# Patient Record
Sex: MALE | Race: WHITE
[De-identification: names, ages, dates, MRNs, and addresses within clinical notes are randomized per-mention and may not be internally consistent; named-entity substitution may affect disease eponyms.]

---

## 2019-06-09 ENCOUNTER — HOSPITAL ENCOUNTER (EMERGENCY)
Dept: HOSPITAL 91 - E/R | Age: 39
Discharge: HOME | End: 2019-06-09
Payer: MEDICAID

## 2019-06-09 ENCOUNTER — HOSPITAL ENCOUNTER (EMERGENCY)
Dept: HOSPITAL 10 - E/R | Age: 39
Discharge: HOME | End: 2019-06-09
Payer: MEDICAID

## 2019-06-09 VITALS
BODY MASS INDEX: 43.44 KG/M2 | HEIGHT: 66 IN | BODY MASS INDEX: 43.44 KG/M2 | WEIGHT: 270.29 LBS | WEIGHT: 270.29 LBS | HEIGHT: 66 IN

## 2019-06-09 VITALS — HEART RATE: 70 BPM | RESPIRATION RATE: 16 BRPM | SYSTOLIC BLOOD PRESSURE: 138 MMHG | DIASTOLIC BLOOD PRESSURE: 86 MMHG

## 2019-06-09 DIAGNOSIS — L97.512: Primary | ICD-10-CM

## 2019-06-09 DIAGNOSIS — F17.210: ICD-10-CM

## 2019-06-09 PROCEDURE — 82962 GLUCOSE BLOOD TEST: CPT

## 2019-06-09 PROCEDURE — 73630 X-RAY EXAM OF FOOT: CPT

## 2019-06-09 PROCEDURE — 99283 EMERGENCY DEPT VISIT LOW MDM: CPT

## 2019-06-09 NOTE — ERD
ER Documentation


Chief Complaint


Chief Complaint





RIGHT DORSAL PLANTAR ULCER, YELLOW DISCHARGE NOTED,+PAIN





HPI


This is a 38-year-old male who is here for a right foot ulcer.  Patient had an 


ulcer there for about 3 weeks and he says he is not diabetic.  Patient says he 


has had them before and had a check for diabetes but he is not.  He states that 


there is no pain and that he is visiting from [place] and wants to have it 


looked out





ROS


All systems reviewed and are negative except as per history of present illness.





PMhx/Soc


Medical and Surgical Hx:  pt denies Medical Hx, pt denies Surgical Hx


Hx Alcohol Use:  No


Hx Substance Use:  No


Hx Tobacco Use:  Yes


Smoking Status:  Current some day smoker





FmHx


Family History:  No coronary disease





Physical Exam


Vitals





Vital Signs


  Date      Temp  Pulse  Resp  B/P (MAP)   Pulse Ox  O2          O2 Flow    FiO2


Time                                                 Delivery    Rate


    19  98.0    108    16      154/96        95


     12:13                          (115)





Physical Exam


Const:   No acute distress


Head:   Atraumatic 


Eyes:    Normal Conjunctiva


ENT:    Normal External Ears, Nose and Mouth.


Neck:               Full range of motion. No meningismus.


Resp:   Clear to auscultation bilaterally


Cardio:   Regular rate and rhythm, no murmurs


Abd:    Soft, non tender, non distended. Normal bowel sounds


Skin:   No petechiae or rashes


Back:   No midline or flank tenderness


Ext:    At the ball of the foot there is a dime sized ulcer that is fairly deep 


located on/at the junction of the second metatarsal phalangeal joint, there is 


no erythema


Neur:   Awake and alert


Psych:    Normal Mood and Affect


Results 24 hrs





Laboratory Tests


                         Test
            19
12:15


                         Bedside Glucose    113 mg/dL








[facility]/[place] [id number]


                        Radiology Main Line: [phone]





                            DIAGNOSTIC IMAGING REPORT





Patient: MYA COONEY   : 1980   Age: 38  Sex: M                      


 


       MR #:    [id number]   Acct #:   [id number]    DOS: 19 1354


Ordering MD: NATASHA SOLER DO   Location:  E/R   Room/Bed:                


                           


                                        


PROCEDURE:   XR Foot. 


 


CLINICAL INDICATION:  Right foot pain


 


TECHNIQUE:    Three views of the right foot are available for review. 


 


COMPARISON:   None available


 


FINDINGS:


 


Is cortical thickening and irregularity of the fifth proximal phalanx.   Bone 


mineral density is preserved.  The articular surfaces are smooth without 


evidence of marginal erosions. Os peroneum. There is soft tissue ulceration at 


the plantar aspect of the forefoot.  


 


IMPRESSION:


 


1.  Plantar soft tissue ulceration at the forefoot, partially assessed. 


2.  Cortical irregularity and thickening at the fifth proximal phalanx either 


from prior trauma or infection. Please note that MRI is more sensitive in 


evaluating for early changes of osteomyelitis.


 


RPTAT: UU


_____________________________________________ 


.Will Taveras MD, MD           Date    Time 


Electronically viewed and signed by .Will Taveras MD, MD on 2019 


14:14 


 


D:  2019 14:14  T:  2019 14:14


.d/





CC: NATASHA SOLER DO





[phone]

















Fingerstick is 113 this patient has a foot ulcer








Of an uncertain etiology it is not from diabetes apparently.  No signs of 


osteomyelitis.  The cortical irregularity on the x-ray of the foot is located at


the fifth toe and not the area where the ulcer is.  There is no sign of 


infection whatsoever on his foot at this area.  I will cover him with antibiotic


therapy and I reviewed with him home wound care that he needs to see a wound 


care physician when he gets back to [place]





Departure


Diagnosis:  


   Primary Impression:  


   Foot ulcer


   Laterality:  right  Non-pressure ulcer stage:  with fat layer exposed  


   Qualified Codes:  L97.512 - Non-pressure chronic ulcer of other part of right


   foot with fat layer exposed


Condition:  Stable











NATASHA SOLER DO          2019 15:33

## 2019-08-18 ENCOUNTER — HOSPITAL ENCOUNTER (EMERGENCY)
Dept: HOSPITAL 10 - E/R | Age: 39
Discharge: HOME | End: 2019-08-18
Payer: MEDICAID

## 2019-08-18 ENCOUNTER — HOSPITAL ENCOUNTER (EMERGENCY)
Dept: HOSPITAL 91 - E/R | Age: 39
Discharge: HOME | End: 2019-08-18
Payer: SELF-PAY

## 2019-08-18 VITALS — HEART RATE: 113 BPM | DIASTOLIC BLOOD PRESSURE: 65 MMHG | RESPIRATION RATE: 18 BRPM | SYSTOLIC BLOOD PRESSURE: 107 MMHG

## 2019-08-18 VITALS
BODY MASS INDEX: 33.41 KG/M2 | WEIGHT: 220.46 LBS | HEIGHT: 68 IN | WEIGHT: 220.46 LBS | HEIGHT: 68 IN | BODY MASS INDEX: 33.41 KG/M2

## 2019-08-18 DIAGNOSIS — F17.210: ICD-10-CM

## 2019-08-18 DIAGNOSIS — R42: ICD-10-CM

## 2019-08-18 DIAGNOSIS — F11.23: Primary | ICD-10-CM

## 2019-08-18 PROCEDURE — 96374 THER/PROPH/DIAG INJ IV PUSH: CPT

## 2019-08-18 PROCEDURE — 93005 ELECTROCARDIOGRAM TRACING: CPT

## 2019-08-18 PROCEDURE — 99284 EMERGENCY DEPT VISIT MOD MDM: CPT

## 2019-08-18 RX ADMIN — THIAMINE HYDROCHLORIDE 1 MLS/HR: 100 INJECTION, SOLUTION INTRAMUSCULAR; INTRAVENOUS at 13:50

## 2019-08-18 RX ADMIN — LORAZEPAM 1 MG: 2 INJECTION, SOLUTION INTRAMUSCULAR; INTRAVENOUS at 13:50

## 2019-08-18 NOTE — ERD
ER Documentation


Chief Complaint


Chief Complaint





BIB [facility] FOR EVAL OF POSSIBLE DRUG WD. PT TAPERING METHADONE. C/O N/V





HPI


39-year-old male who has history of chronic pain is currently on methadone.  The


patient had payment issues and his methadone dose was decreased because of this 


over the last several days.  Patient has been experiencing withdrawal symptoms 


including lightheadedness, nausea, diaphoresis and occasional loose stools.  


Patient had a slight increase in his dose today but had persistent symptoms 


therefore presented to the emergency room.  He denies any fevers chills chest 


pain or shortness of breath.





ROS


All systems reviewed and are negative except as per history of present illness.





Medications


Home Meds


Active Scripts


Levofloxacin* (Levaquin*) 500 Mg Tablet, 500 MG PO DAILY for 14 Days, TAB


   Prov:NATAHSA SOLER DO         6/9/19





Allergies


Allergies:  


Coded Allergies:  


     No Known Allergy (Unverified , 8/18/19)





PMhx/Soc


Hx Alcohol Use:  No


Hx Substance Use:  No


Hx Tobacco Use:  Yes


Smoking Status:  Current every day smoker





FmHx


Family History:  No diabetes





Physical Exam


Vitals





Vital Signs


  Date      Temp  Pulse  Resp  B/P (MAP)   Pulse Ox  O2          O2 Flow    FiO2


Time                                                 Delivery    Rate


   8/18/19  98.2    113    18      107/65        97  Room Air


     15:31                           (79)


   8/18/19  97.9    125    20      150/90        99


     12:39                          (110)





Physical Exam


General: Well developed, well nourished, no acute distress


Head: Normocephalic, atraumatic.


Eyes: Pupils equally reactive, EOM intact


ENT: Moist mucous membranes


Neck: Supple, no lymphadenopathy


Respiratory: Lungs clear bilaterally, no distress


Cardiovascular: Tachycardia, no murmurs, rubs, or gallops


Abdominal: Soft, non-tender, non-distended, no peritoneal signs


: Deferred


MSK: No edema, no unilateral swelling, 5/5 strength


Neurologic: Alert and oriented, moving all extremities, normal speech, no focal 


weakness, no cerebellar signs


Skin: No rash


Psych: Normal mood


Results 24 hrs





Current Medications


 Medications
   Dose
          Sig/Mckenzie
       Start Time
   Status  Last


 (Trade)       Ordered        Route
 PRN     Stop Time              Admin
Dose


                              Reason                                Admin


 Sodium         1,000 ml @ 
   Q1H STAT
      8/18/19       DC           8/18/19


Chloride       1,000 mls/hr   IV
            13:10
                       13:50



                                             8/18/19 14:09


 Lorazepam
     1 mg           ONCE  ONCE
    8/18/19       DC           8/18/19


(Ativan)                      IV
            13:30
                       13:50



                                             8/18/19 13:31


 Clonidine
     0.1 mg         ONCE  ONCE
    8/18/19       DC           8/18/19


(Catapres)                    PO
            13:30
                       13:51



                                             8/18/19 13:31








Procedures/MDM


Patient likely has mild opiate withdrawal syndrome.  Patient otherwise is well-


appearing without signs or symptoms concerning for more serious etiology. 





Patient should continue to increase to his regular dose of methadone.





An IV was established and the patient was given IV fluids, Ativan and clonidine.


 The patient's vital signs are improving.  Subjectively he is feeling much 


better.





The patient does not have an identifiable emergent medical condition that 


warrants inpatient hospitalization at this time.  The patient is deemed safe for


discharge with outpatient follow-up.





We discussed follow up with the patient's primary care doctor within 24 to 48 


hours as needed.  We also discussed return to the emergency room for worsening 


symptoms or worsening condition.





Outpatient referral: None required





Discharge Medications:


None required





Departure


Diagnosis:  


   Primary Impression:  


   Opiate withdrawal


Condition:  Stable


Patient Instructions:  Medical Screening Exam, Nonurgent


Referrals:  


[facility]


YOU HAVE RECEIVED A MEDICAL SCREENING EXAM AND THE RESULTS INDICATE THAT YOU DO 


NOT HAVE A CONDITION THAT REQUIRES URGENT TREATMENT IN THE EMERGENCY DEPARTMENT.





FURTHER EVALUATION AND TREATMENT OF YOUR CONDITION CAN WAIT UNTIL YOU ARE SEEN 


IN YOUR DOCTORS OFFICE WITHIN THE NEXT 1-2 DAYS. IT IS YOUR RESPONSIBILITY TO 


MAKE AN APPOINTMENT FOR FOLOW-UP CARE.





IF YOU HAVE A PRIMARY DOCTOR


--you should call your primary doctor and schedule an appointment





IF YOU DO NOT HAVE A PRIMARY DOCTOR YOU CAN CALL OUR PHYSICIAN REFERRAL HOTLINE 


AT


 [phone] 





IF YOU CAN NOT AFFORD TO SEE A PHYSICIAN YOU CAN CHOSE FROM THE FOLLOWING 


Critical access hospital CLINICS





[facility] [phone] [address] [facility]. [facility] [phone] [address] [facility]. [facility] [phone] [address] [facility]. [facility] [phone] [address] [facility]. [facility] [phone]


[facility]. [facility]. [phone]


[address] [facility]. [facility]


YOU HAVE RECEIVED A MEDICAL SCREENING EXAM AND THE RESULTS INDICATE THAT YOU DO 


NOT HAVE A CONDITION THAT REQUIRES URGENT TREATMENT IN THE EMERGENCY DEPARTMENT.





FURTHER EVALUATION AND TREATMENT OF YOUR CONDITION CAN WAIT UNTIL YOU ARE SEEN 


IN YOUR DOCTORS OFFICE WITHIN THE NEXT 1-2 DAYS. IT IS YOUR RESPONSIBILITY TO 


MAKE AN APPOINTMENT FOR FOLOW-UP CARE.





IF YOU HAVE A PRIMARY DOCTOR


--you should call your primary doctor and schedule and appointment





IF YOU DO NOT HAVE A PRIMARY DOCTOR YOU CAN CALL OUR PHYSICIAN REFERRAL HOTLINE 


AT [phone].





IF YOU CAN NOT AFFORD TO SEE A PHYSICIAN YOU CAN CHOSE FROM THE FOLLOWING [facility]


INSTITUTIONS:





[facility]


[address] [place] [address]





[facility]


[address] [place], [place] [address] [place] [address]


[address] [place] [address]





Additional Instructions:  


Call your primary care doctor TOMORROW for an appointment during the next 1 


WEEK.Tell the  that you were referred from this facility.See the doctor


sooner or return here if your  condition worsens before your appointment time.











EROS SALGUERO MD          Aug 18, 2019 16:50